# Patient Record
Sex: MALE | URBAN - METROPOLITAN AREA
[De-identification: names, ages, dates, MRNs, and addresses within clinical notes are randomized per-mention and may not be internally consistent; named-entity substitution may affect disease eponyms.]

---

## 2021-07-01 ENCOUNTER — OFFICE VISIT (OUTPATIENT)
Dept: URBAN - METROPOLITAN AREA CLINIC 17 | Facility: CLINIC | Age: 56
End: 2021-07-01
Payer: COMMERCIAL

## 2021-07-01 DIAGNOSIS — H11.153 PINGUECULA, BILATERAL: ICD-10-CM

## 2021-07-01 DIAGNOSIS — E11.3291 TYPE 2 DIAB W MILD NONPRLF DIABETIC RTNOP W/O MACULAR EDEMA, RIGHT EYE: ICD-10-CM

## 2021-07-01 DIAGNOSIS — E11.3312 TYPE 2 DIAB W MODERATE NONPRLF DIAB RTNOP W MACULAR EDEMA, LEFT EYE: Primary | ICD-10-CM

## 2021-07-01 DIAGNOSIS — H40.013 OPEN ANGLE WITH BORDERLINE FINDINGS, LOW RISK, BILATERAL: ICD-10-CM

## 2021-07-01 DIAGNOSIS — H25.813 COMBINED FORMS OF AGE-RELATED CATARACT, BILATERAL: ICD-10-CM

## 2021-07-01 PROCEDURE — 99204 OFFICE O/P NEW MOD 45 MIN: CPT | Performed by: OPTOMETRIST

## 2021-07-01 ASSESSMENT — INTRAOCULAR PRESSURE
OD: 17
OS: 19

## 2021-07-01 NOTE — IMPRESSION/PLAN
Impression: Open angle with borderline findings, low risk, bilateral: H40.013. Plan: Obtained and reviewed PACHS and RNFL OCT today with patient. Borderline glaucoma, intraocular pressure stable without medication. Continue without medication and observe.

## 2021-07-01 NOTE — IMPRESSION/PLAN
Impression: Type 2 diab w mild nonprlf diabetic rtnop w/o macular edema, right eye: N27.9036. Plan: See above assessment.

## 2021-07-01 NOTE — IMPRESSION/PLAN
Impression: Type 2 diab w moderate nonprlf diab rtnop w macular edema, left eye: Y13.0609. Plan: Diabetes type II: moderate background diabetic retinopathy, no signs of neovascularization noted. Will refer patient for a retinal consult to determine if treatment is necessary. Discussed ocular and systemic benefits of maintaining blood sugar control. Obtained and reviewed MAC OCT and OPTOS with patient.

## 2021-07-01 NOTE — IMPRESSION/PLAN
Impression: Combined forms of age-related cataract, bilateral: H25.813. Plan: Discussed diagnosis with patient. Cataract evaluation is recommended. Will refer to Dr. Karely Alvarez for CAT Eval. Pt wishes to proceed.

## 2021-07-27 ENCOUNTER — OFFICE VISIT (OUTPATIENT)
Dept: URBAN - METROPOLITAN AREA CLINIC 17 | Facility: CLINIC | Age: 56
End: 2021-07-27
Payer: COMMERCIAL

## 2021-07-27 DIAGNOSIS — H31.092 OTHER CHORIORETINAL SCARS, LEFT EYE: ICD-10-CM

## 2021-07-27 PROCEDURE — 92134 CPTRZ OPH DX IMG PST SGM RTA: CPT | Performed by: OPHTHALMOLOGY

## 2021-07-27 PROCEDURE — 92004 COMPRE OPH EXAM NEW PT 1/>: CPT | Performed by: OPHTHALMOLOGY

## 2021-07-27 PROCEDURE — 92235 FLUORESCEIN ANGRPH MLTIFRAME: CPT | Performed by: OPHTHALMOLOGY

## 2021-07-27 ASSESSMENT — INTRAOCULAR PRESSURE
OD: 14
OS: 16

## 2021-07-27 NOTE — IMPRESSION/PLAN
Impression: Combined forms of age-related cataract, bilateral: H25.813.  Plan:  Cat eval 

 Counseled patient post CE VA limited by retinal scar OS and  OU- he understood

## 2021-07-27 NOTE — IMPRESSION/PLAN
Impression: Other chorioretinal scars, left eye: H31.092. Plan:  Central scar without NV/fluid -- distant history of injury Looks stable/old   Observe

## 2021-07-27 NOTE — IMPRESSION/PLAN
Impression: Type 2 diabetes mellitus with mild nonproliferative diabetic retinopathy without macular edema, bilateral: B89.3393. Plan:  No NV on FA (extensive peripheral nonperfusion) , No ME on OCT Observe BG/BP control Note to PCP  6m OCT/exam

## 2021-08-27 ENCOUNTER — OFFICE VISIT (OUTPATIENT)
Dept: URBAN - METROPOLITAN AREA CLINIC 17 | Facility: CLINIC | Age: 56
End: 2021-08-27
Payer: COMMERCIAL

## 2021-08-27 DIAGNOSIS — H25.012 CORTICAL AGE-RELATED CATARACT, LEFT EYE: ICD-10-CM

## 2021-08-27 DIAGNOSIS — H25.013 CORTICAL AGE-RELATED CATARACT, BILATERAL: Primary | ICD-10-CM

## 2021-08-27 PROCEDURE — 99214 OFFICE O/P EST MOD 30 MIN: CPT | Performed by: OPHTHALMOLOGY

## 2021-08-27 ASSESSMENT — INTRAOCULAR PRESSURE
OD: 17
OS: 17

## 2021-08-27 ASSESSMENT — KERATOMETRY
OS: 43.50
OD: 43.25

## 2021-08-27 ASSESSMENT — VISUAL ACUITY
OS: 20/150-
OD: 20/70

## 2021-08-27 NOTE — IMPRESSION/PLAN
Impression: Cortical age-related cataract, bilateral: H25.013. Condition: established, worsening. Symptoms: could improve with surgery. Plan: Cataract accounts for patient's complaints. Reviewed risks, benefits, and procedure. Patient desires surgery, schedule ce/iol OD then OS, RL2, standard lens, distance refractive target, patient is clear for surgery in [place] [id number]. Pt to use NSAID for 6-8 weeks after surgery. Advised pt visual improvement may be limited due to retina scar OS and DM retinopathy. Pt aware and would like to proceed. Pt aware may need full time bifocal glasses after surgery for best vision.

## 2021-09-08 ENCOUNTER — PRE-OPERATIVE VISIT (OUTPATIENT)
Dept: URBAN - METROPOLITAN AREA CLINIC 17 | Facility: CLINIC | Age: 56
End: 2021-09-08
Payer: COMMERCIAL

## 2021-09-08 ASSESSMENT — PACHYMETRY
OD: 3.21
OD: 24.00
OS: 24.36
OS: 3.45

## 2021-09-16 ENCOUNTER — SURGERY (OUTPATIENT)
Dept: URBAN - METROPOLITAN AREA SURGERY 7 | Facility: SURGERY | Age: 56
End: 2021-09-16
Payer: COMMERCIAL

## 2021-09-16 PROCEDURE — 66984 XCAPSL CTRC RMVL W/O ECP: CPT | Performed by: OPHTHALMOLOGY

## 2021-09-17 ENCOUNTER — POST-OPERATIVE VISIT (OUTPATIENT)
Dept: URBAN - METROPOLITAN AREA CLINIC 17 | Facility: CLINIC | Age: 56
End: 2021-09-17
Payer: COMMERCIAL

## 2021-09-17 PROCEDURE — 99024 POSTOP FOLLOW-UP VISIT: CPT | Performed by: OPTOMETRIST

## 2021-09-17 ASSESSMENT — INTRAOCULAR PRESSURE
OS: 16
OD: 15

## 2021-09-17 NOTE — IMPRESSION/PLAN
Impression: S/P Cataract Extraction by phacoemulsification with IOL placement [id number] OD - 1 Day. Encounter for surgical aftercare following surgery on a sense organ  Z48.810. Plan: As scheduled for PO2 --Advised patient to use artificial tears for comfort.

## 2021-09-22 ENCOUNTER — POST-OPERATIVE VISIT (OUTPATIENT)
Dept: URBAN - METROPOLITAN AREA CLINIC 17 | Facility: CLINIC | Age: 56
End: 2021-09-22
Payer: COMMERCIAL

## 2021-09-22 DIAGNOSIS — Z48.810 ENCOUNTER FOR SURGICAL AFTERCARE FOLLOWING SURGERY ON A SENSE ORGAN: Primary | ICD-10-CM

## 2021-09-22 PROCEDURE — 99024 POSTOP FOLLOW-UP VISIT: CPT | Performed by: OPTOMETRIST

## 2021-09-22 NOTE — IMPRESSION/PLAN
Impression: S/P Cataract Extraction by phacoemulsification with IOL placement [id number] OD - 6 Days. Encounter for surgical aftercare following surgery on a sense organ  Z48.810. Plan: Proceed with second eye surgery.

## 2021-09-30 ENCOUNTER — SURGERY (OUTPATIENT)
Dept: URBAN - METROPOLITAN AREA SURGERY 7 | Facility: SURGERY | Age: 56
End: 2021-09-30
Payer: COMMERCIAL

## 2021-09-30 PROCEDURE — 66984 XCAPSL CTRC RMVL W/O ECP: CPT | Performed by: OPHTHALMOLOGY

## 2021-10-01 ENCOUNTER — POST-OPERATIVE VISIT (OUTPATIENT)
Dept: URBAN - METROPOLITAN AREA CLINIC 17 | Facility: CLINIC | Age: 56
End: 2021-10-01
Payer: COMMERCIAL

## 2021-10-01 PROCEDURE — 99024 POSTOP FOLLOW-UP VISIT: CPT | Performed by: OPTOMETRIST

## 2021-10-01 ASSESSMENT — INTRAOCULAR PRESSURE
OD: 18
OS: 30

## 2021-10-01 NOTE — IMPRESSION/PLAN
Impression: S/P Cataract Extraction by phacoemulsification with IOL placement [id number] OS - 1 Day. Presence of intraocular lens  Z96.1. Post operative instructions reviewed Plan: Due to elevated IOP OS, start Betimol Q12H OS only. (sample given to patient) Return in 1 week.

## 2021-10-11 ENCOUNTER — POST-OPERATIVE VISIT (OUTPATIENT)
Dept: URBAN - METROPOLITAN AREA CLINIC 17 | Facility: CLINIC | Age: 56
End: 2021-10-11
Payer: COMMERCIAL

## 2021-10-11 DIAGNOSIS — Z96.1 PRESENCE OF INTRAOCULAR LENS: Primary | ICD-10-CM

## 2021-10-11 RX ORDER — KETOROLAC TROMETHAMINE 5 MG/ML
0.5 % SOLUTION OPHTHALMIC
Qty: 10 | Refills: 1 | Status: INACTIVE
Start: 2021-10-11 | End: 2021-11-10

## 2021-10-11 RX ORDER — PREDNISOLONE ACETATE 10 MG/ML
1 % SUSPENSION/ DROPS OPHTHALMIC
Qty: 5 | Refills: 2 | Status: INACTIVE
Start: 2021-10-11 | End: 2021-11-10

## 2021-10-11 ASSESSMENT — INTRAOCULAR PRESSURE
OD: 20
OS: 15

## 2021-10-11 ASSESSMENT — VISUAL ACUITY: OD: 20/100

## 2021-10-11 NOTE — IMPRESSION/PLAN
Impression: S/P Cataract Extraction by phacoemulsification with IOL placement [id number] OS - 11 Days. Presence of intraocular lens  Z96.1. Ordered & reviewed MAC OCT with pt. OCT shows CME OD, mac scar OS - stable (no fluid) Plan: 3-4 week PO f/u --Advised patient to use artificial tears for comfort. 
--Recommend pt begin pred ace QID OU x1 week, TID OU x1 week, BID OU x1 week, OU x1 week, then d/c
--Recommend pt begin ketorolac QID OU x1 week, TID OU x1 week, BID OU x1 week, OU x1 week, then d/c 
Drop schedule given to pt --okay to d/c timolol OU

## 2021-11-10 ENCOUNTER — POST-OPERATIVE VISIT (OUTPATIENT)
Dept: URBAN - METROPOLITAN AREA CLINIC 17 | Facility: CLINIC | Age: 56
End: 2021-11-10
Payer: COMMERCIAL

## 2021-11-10 RX ORDER — KETOROLAC TROMETHAMINE 5 MG/ML
0.5 % SOLUTION OPHTHALMIC
Qty: 10 | Refills: 1 | Status: INACTIVE
Start: 2021-11-10 | End: 2021-12-10

## 2021-11-10 ASSESSMENT — INTRAOCULAR PRESSURE
OD: 16
OS: 15

## 2021-11-10 ASSESSMENT — VISUAL ACUITY
OD: 20/60
OS: 20/150

## 2021-11-10 NOTE — IMPRESSION/PLAN
Impression: S/P Cataract Extraction by phacoemulsification with IOL placement [id number] OS - 41 Days. Presence of intraocular lens  Z96.1. Obtained and reviewed MAC OCT with pt today. OD: slightly increased CME, OS: scarring, stable, no CME. Plan: Return in 1 month. D/c Pred ace.   Recommend patient to increase Ketorolac to QID OD only due to slight increase in CME OD.  (eRx'd refills)

## 2021-12-10 ENCOUNTER — POST-OPERATIVE VISIT (OUTPATIENT)
Dept: URBAN - METROPOLITAN AREA CLINIC 17 | Facility: CLINIC | Age: 56
End: 2021-12-10
Payer: COMMERCIAL

## 2021-12-10 RX ORDER — KETOROLAC TROMETHAMINE 5 MG/ML
0.5 % SOLUTION OPHTHALMIC
Qty: 10 | Refills: 1 | Status: INACTIVE
Start: 2021-12-10 | End: 2022-02-10

## 2021-12-10 ASSESSMENT — INTRAOCULAR PRESSURE
OS: 16
OD: 14

## 2021-12-10 ASSESSMENT — VISUAL ACUITY
OD: 20/60
OS: 20/70-2

## 2021-12-10 NOTE — IMPRESSION/PLAN
Impression: S/P Cataract Extraction by phacoemulsification with IOL placement [id number] OS - 71 Days. Presence of intraocular lens  Z96.1. Plan: Continue Ketorolac QID OD x1 month then d/c. Reviewed MAC OCT today, improved CME OD. Return in 2 months for DE, MAC OCT and OPTOS. --Advised patient to use artificial tears for comfort.

## 2022-01-11 ENCOUNTER — OFFICE VISIT (OUTPATIENT)
Dept: URBAN - METROPOLITAN AREA CLINIC 17 | Facility: CLINIC | Age: 57
End: 2022-01-11
Payer: COMMERCIAL

## 2022-01-11 PROCEDURE — 92235 FLUORESCEIN ANGRPH MLTIFRAME: CPT | Performed by: OPHTHALMOLOGY

## 2022-01-11 PROCEDURE — 92014 COMPRE OPH EXAM EST PT 1/>: CPT | Performed by: OPHTHALMOLOGY

## 2022-01-11 PROCEDURE — 92134 CPTRZ OPH DX IMG PST SGM RTA: CPT | Performed by: OPHTHALMOLOGY

## 2022-01-11 ASSESSMENT — INTRAOCULAR PRESSURE
OS: 15
OD: 19

## 2022-01-11 NOTE — IMPRESSION/PLAN
Impression: Type 2 diabetes mellitus with mild nonproliferative diabetic retinopathy without macular edema, bilateral: W41.1423. Plan: No NV on FA (extensive peripheral nonperfusion) , No ME on OCT Observe BG/BP control Note to PCP  6m OCT/exam

## 2022-02-10 ENCOUNTER — OFFICE VISIT (OUTPATIENT)
Dept: URBAN - METROPOLITAN AREA CLINIC 17 | Facility: CLINIC | Age: 57
End: 2022-02-10
Payer: COMMERCIAL

## 2022-02-10 DIAGNOSIS — E11.3293 TYPE 2 DIAB W MILD NONPRLF DIABETIC RTNOP W/O MACULAR EDEMA, BILATERAL: Primary | ICD-10-CM

## 2022-02-10 PROCEDURE — 99214 OFFICE O/P EST MOD 30 MIN: CPT | Performed by: OPTOMETRIST

## 2022-02-10 RX ORDER — KETOROLAC TROMETHAMINE 5 MG/ML
0.5 % SOLUTION OPHTHALMIC
Qty: 10 | Refills: 1 | Status: ACTIVE
Start: 2022-02-10

## 2022-02-10 ASSESSMENT — INTRAOCULAR PRESSURE
OD: 17
OS: 14

## 2022-02-10 NOTE — IMPRESSION/PLAN
Impression: Type 2 diab w mild nonprlf diabetic rtnop w/o macular edema, bilateral: G28.9649. Plan: Diabetes type II: moderate background diabetic retinopathy, no signs of neovascularization noted. Will refer patient for a retinal consult to determine if treatment is necessary. Discussed ocular and systemic benefits of maintaining blood sugar control.  Continue Ketorolac QID OU for 4-6 weeks

## 2022-07-12 ENCOUNTER — OFFICE VISIT (OUTPATIENT)
Dept: URBAN - METROPOLITAN AREA CLINIC 17 | Facility: CLINIC | Age: 57
End: 2022-07-12
Payer: COMMERCIAL

## 2022-07-12 DIAGNOSIS — E11.3293 TYPE 2 DIABETES MELLITUS WITH MILD NONPROLIFERATIVE DIABETIC RETINOPATHY WITHOUT MACULAR EDEMA, BILATERAL: Primary | ICD-10-CM

## 2022-07-12 PROCEDURE — 92014 COMPRE OPH EXAM EST PT 1/>: CPT | Performed by: OPHTHALMOLOGY

## 2022-07-12 PROCEDURE — 92134 CPTRZ OPH DX IMG PST SGM RTA: CPT | Performed by: OPHTHALMOLOGY

## 2022-07-12 ASSESSMENT — INTRAOCULAR PRESSURE
OD: 16
OS: 13

## 2022-07-12 NOTE — IMPRESSION/PLAN
Impression: Type 2 diabetes mellitus with mild nonproliferative diabetic retinopathy without macular edema, bilateral: F71.1040. Plan: No NV on FA (extensive peripheral nonperfusion) , No ME on OCT Observe Discussed condition/plan with patient including BG/BP control. Note to PCP  3m OCT/exam

## 2022-10-11 ENCOUNTER — OFFICE VISIT (OUTPATIENT)
Dept: URBAN - METROPOLITAN AREA CLINIC 17 | Facility: CLINIC | Age: 57
End: 2022-10-11
Payer: COMMERCIAL

## 2022-10-11 DIAGNOSIS — E11.3311 TYPE 2 DIABETES MELLITUS WITH MODERATE NONPROLIFERATIVE DIABETIC RETINOPATHY WITH MACULAR EDEMA, RIGHT EYE: Primary | ICD-10-CM

## 2022-10-11 DIAGNOSIS — E11.3392 TYPE 2 DIAB WITH MOD NONP RTNOP WITHOUT MACULAR EDEMA, L EYE: ICD-10-CM

## 2022-10-11 PROCEDURE — 92014 COMPRE OPH EXAM EST PT 1/>: CPT | Performed by: OPHTHALMOLOGY

## 2022-10-11 PROCEDURE — 92134 CPTRZ OPH DX IMG PST SGM RTA: CPT | Performed by: OPHTHALMOLOGY

## 2022-10-11 PROCEDURE — 67028 INJECTION EYE DRUG: CPT | Performed by: OPHTHALMOLOGY

## 2022-10-11 ASSESSMENT — INTRAOCULAR PRESSURE
OD: 19
OS: 18

## 2022-10-11 NOTE — IMPRESSION/PLAN
Impression: Type 2 diab with mod nonp rtnop without macular edema, l eye: T99.6137. Plan: Stable VA, no ME on OCT , no NV on FA - observe Discussed condition/plan with patient including no treatment necessary today. Discussed BG/BP control. Note to PCP. OCT/optos ordered today.  
  6m OCT/DIL OS

## 2022-10-11 NOTE — IMPRESSION/PLAN
Impression: Type 2 diabetes mellitus with moderate nonproliferative diabetic retinopathy with macular edema, right eye: L33.1894. Plan:  New onset DME central OD -- IRVIN (sample) OD today No NV on FA Discussed condition/plan with patient including treatment options observation vs injection. He wants to proceed with injection. Discussed BG/BP control. Note to PCP. OCT/optos ordered today. 4w OCT/EXAM OD poss DANIELE OD (get auth) *25 mod for changing/NEW exam findings

## 2023-05-23 ENCOUNTER — OFFICE VISIT (OUTPATIENT)
Dept: URBAN - METROPOLITAN AREA CLINIC 17 | Facility: CLINIC | Age: 58
End: 2023-05-23
Payer: COMMERCIAL

## 2023-05-23 DIAGNOSIS — E11.3311 TYPE 2 DIABETES MELLITUS WITH MODERATE NONPROLIFERATIVE DIABETIC RETINOPATHY WITH MACULAR EDEMA, RIGHT EYE: Primary | ICD-10-CM

## 2023-05-23 DIAGNOSIS — E11.3392 TYPE 2 DIAB WITH MOD NONP RTNOP WITHOUT MACULAR EDEMA, L EYE: ICD-10-CM

## 2023-05-23 PROCEDURE — 92014 COMPRE OPH EXAM EST PT 1/>: CPT | Performed by: OPHTHALMOLOGY

## 2023-05-23 PROCEDURE — 92134 CPTRZ OPH DX IMG PST SGM RTA: CPT | Performed by: OPHTHALMOLOGY

## 2023-05-23 ASSESSMENT — INTRAOCULAR PRESSURE
OD: 17
OS: 17

## 2023-05-23 NOTE — IMPRESSION/PLAN
Impression: Type 2 diabetes mellitus with moderate nonproliferative diabetic retinopathy with macular edema, right eye: P70.8739. Plan: s/p IRVIN OD (10/11/22) -- today DME better (nearly gone) - observe No NV on FA Discussed condition/plan with patient. No treatment needed today. Discussed BG/BP control. Patient understands/agrees with plan. Note to PCP. OCT/optos ordered today. 6m OCT/EXAM OD poss DANIELE OD (get auth)

## 2023-05-23 NOTE — IMPRESSION/PLAN
Impression: Type 2 diab with mod nonp rtnop without macular edema, l eye: O63.0385. Plan: Stable VA, no ME on OCT , no NV on FA - observe Discussed condition/plan with patient including no treatment necessary today. Discussed BG/BP control. Note to PCP. OCT/optos ordered today.  
  6m OCT/DIL OS

## 2023-11-21 ENCOUNTER — OFFICE VISIT (OUTPATIENT)
Dept: URBAN - METROPOLITAN AREA CLINIC 17 | Facility: CLINIC | Age: 58
End: 2023-11-21

## 2023-11-21 DIAGNOSIS — E11.3311 TYPE 2 DIABETES MELLITUS WITH MODERATE NONPROLIFERATIVE DIABETIC RETINOPATHY WITH MACULAR EDEMA, RIGHT EYE: Primary | ICD-10-CM

## 2023-11-21 DIAGNOSIS — E11.3392 TYPE 2 DIAB WITH MOD NONP RTNOP WITHOUT MACULAR EDEMA, L EYE: ICD-10-CM

## 2023-11-21 PROCEDURE — 92134 CPTRZ OPH DX IMG PST SGM RTA: CPT | Performed by: OPHTHALMOLOGY

## 2023-11-21 PROCEDURE — 92014 COMPRE OPH EXAM EST PT 1/>: CPT | Performed by: OPHTHALMOLOGY
